# Patient Record
Sex: FEMALE | Race: AMERICAN INDIAN OR ALASKA NATIVE
[De-identification: names, ages, dates, MRNs, and addresses within clinical notes are randomized per-mention and may not be internally consistent; named-entity substitution may affect disease eponyms.]

---

## 2018-01-19 ENCOUNTER — HOSPITAL ENCOUNTER (OUTPATIENT)
Dept: HOSPITAL 5 - XRAY | Age: 48
Discharge: HOME | End: 2018-01-19
Attending: INTERNAL MEDICINE
Payer: COMMERCIAL

## 2018-01-19 DIAGNOSIS — I50.9: ICD-10-CM

## 2018-01-19 DIAGNOSIS — I11.0: ICD-10-CM

## 2018-01-19 DIAGNOSIS — M06.9: Primary | ICD-10-CM

## 2018-01-19 PROCEDURE — 72100 X-RAY EXAM L-S SPINE 2/3 VWS: CPT

## 2018-01-19 NOTE — XRAY REPORT
RIGHT SHOULDER RADIOGRAPHS



INDICATION: Rheumatoid arthritis, congestive heart failure.



COMPARISON: None similar.



FINDINGS: Frontal and Y views of the right shoulder, 3 projections 

demonstrate normal humeral head contour, well positioned against the 

glenoid. Normal acromioclavicular joint. Preserved scapular contour. 

Normal visualized soft tissues, right ribs and lung. 



CONCLUSION: No acute right shoulder radiographic abnormality, as 

described.



Thank you for the opportunity to participate in this patient's care.

## 2018-01-19 NOTE — XRAY REPORT
RIGHT KNEE RADIOGRAPHS



INDICATION: Rheumatoid arthritis.



COMPARISON: None similar.



FINDINGS:  AP and lateral right knee radiographs demonstrate intact 

bony articulation.  Slight degenerative tibial spine prominence 

questioned versus projectional.  No abnormal density projects within 

the joint space.  No suprapatellar effusion.



CONCLUSION: No acute right knee radiographic abnormality, as described.



Thank you for the opportunity to participate in this patient's care.

## 2018-01-19 NOTE — XRAY REPORT
LUMBAR SPINE RADIOGRAPHS:



INDICATION: Rheumatoid arthritis.



COMPARISON: None similar.



FINDINGS: AP and lateral lumbar spine radiographs demonstrate normal 

vertebral body stature and alignment.  Mild L5-S1 disc narrowing and 

slight degenerative spurring possible.  Normal remainder disc heights.  

Intact SI joints.  Nonobstructive bowel gas pattern.  IUD incidentally 

noted.



CONCLUSION: No acute lumbar radiographic abnormality with an IUD and 

possible L5-S1 degenerative changes, as described.  Please correlate.



Thank you for the opportunity to participate in this patient's care.